# Patient Record
Sex: FEMALE | Race: WHITE
[De-identification: names, ages, dates, MRNs, and addresses within clinical notes are randomized per-mention and may not be internally consistent; named-entity substitution may affect disease eponyms.]

---

## 2018-05-26 ENCOUNTER — HOSPITAL ENCOUNTER (EMERGENCY)
Dept: HOSPITAL 92 - ERS | Age: 33
Discharge: HOME | End: 2018-05-26
Payer: COMMERCIAL

## 2018-05-26 DIAGNOSIS — R07.89: ICD-10-CM
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
## 2018-05-26 DIAGNOSIS — S43.401A: Primary | ICD-10-CM

## 2018-05-26 LAB
PREGU CONTROL BACKGROUND?: (no result)
PREGU CONTROL BAR APPEAR?: YES

## 2018-05-26 PROCEDURE — 93005 ELECTROCARDIOGRAM TRACING: CPT

## 2018-05-26 PROCEDURE — 72100 X-RAY EXAM L-S SPINE 2/3 VWS: CPT

## 2018-05-26 PROCEDURE — 71045 X-RAY EXAM CHEST 1 VIEW: CPT

## 2018-05-26 PROCEDURE — G0390 TRAUMA RESPONS W/HOSP CRITI: HCPCS

## 2018-05-26 PROCEDURE — 81025 URINE PREGNANCY TEST: CPT

## 2018-05-26 NOTE — RAD
CHEST ONE VIEW:

 

HISTORY:

Not provided.

 

COMPARISON:

06/15/2010

 

FINDINGS:

Normal cardiac silhouette.  Pulmonary vessels and hilum are normal.  No consolidation or mass.  No pn
eumothorax or osseous abnormalities.

 

IMPRESSION:

No acute cardiopulmonary process.

 

POS: PPP

## 2018-05-26 NOTE — RAD
LUMBAR SPINE THREE VIEWS:

 

HISTORY:

Not provided.

 

COMPARISON:

None.

 

FINDINGS:

Straightening of normal lumbar lordosis.  Vertebral body height is maintained.  No fracture.  No sign
ificant loss of disk space height.

 

IMPRESSION:

Unremarkable three views lumbar spine.

 

POS: PPP

## 2018-05-26 NOTE — RAD
RIGHT SHOULDER THREE VIEWS:

 

HISTORY:

Not provided.

 

COMPARISON:

None.

 

FINDINGS:

Glenohumeral joint space is preserved.  No fracture or dislocation.  The visualized right ribs are un
remarkable.

 

IMPRESSION:

No fracture or dislocation.

 

POS: PPP